# Patient Record
Sex: MALE | Race: WHITE
[De-identification: names, ages, dates, MRNs, and addresses within clinical notes are randomized per-mention and may not be internally consistent; named-entity substitution may affect disease eponyms.]

---

## 2022-07-08 ENCOUNTER — HOSPITAL ENCOUNTER (INPATIENT)
Dept: HOSPITAL 54 - ER | Age: 62
LOS: 1 days | Discharge: HOME | DRG: 247 | End: 2022-07-09
Attending: NURSE PRACTITIONER | Admitting: NURSE PRACTITIONER
Payer: COMMERCIAL

## 2022-07-08 VITALS — SYSTOLIC BLOOD PRESSURE: 129 MMHG | DIASTOLIC BLOOD PRESSURE: 87 MMHG

## 2022-07-08 VITALS — SYSTOLIC BLOOD PRESSURE: 124 MMHG | DIASTOLIC BLOOD PRESSURE: 77 MMHG

## 2022-07-08 VITALS — HEIGHT: 68 IN | BODY MASS INDEX: 24.4 KG/M2 | WEIGHT: 161 LBS

## 2022-07-08 VITALS — SYSTOLIC BLOOD PRESSURE: 120 MMHG | DIASTOLIC BLOOD PRESSURE: 79 MMHG

## 2022-07-08 VITALS — DIASTOLIC BLOOD PRESSURE: 61 MMHG | SYSTOLIC BLOOD PRESSURE: 112 MMHG

## 2022-07-08 VITALS — DIASTOLIC BLOOD PRESSURE: 64 MMHG | SYSTOLIC BLOOD PRESSURE: 121 MMHG

## 2022-07-08 VITALS — SYSTOLIC BLOOD PRESSURE: 120 MMHG | DIASTOLIC BLOOD PRESSURE: 97 MMHG

## 2022-07-08 VITALS — DIASTOLIC BLOOD PRESSURE: 62 MMHG | SYSTOLIC BLOOD PRESSURE: 126 MMHG

## 2022-07-08 DIAGNOSIS — G47.00: ICD-10-CM

## 2022-07-08 DIAGNOSIS — I25.110: Primary | ICD-10-CM

## 2022-07-08 LAB
BASOPHILS # BLD AUTO: 0 K/UL (ref 0–0.2)
BASOPHILS NFR BLD AUTO: 0.7 % (ref 0–2)
BUN SERPL-MCNC: 12 MG/DL (ref 7–18)
CALCIUM SERPL-MCNC: 9.3 MG/DL (ref 8.5–10.1)
CHLORIDE SERPL-SCNC: 103 MMOL/L (ref 98–107)
CO2 SERPL-SCNC: 28 MMOL/L (ref 21–32)
CREAT SERPL-MCNC: 1 MG/DL (ref 0.6–1.3)
EOSINOPHIL NFR BLD AUTO: 2.4 % (ref 0–6)
GLUCOSE SERPL-MCNC: 116 MG/DL (ref 74–106)
HCT VFR BLD AUTO: 45 % (ref 39–51)
HGB BLD-MCNC: 14.9 G/DL (ref 13.5–17.5)
LYMPHOCYTES NFR BLD AUTO: 1.2 K/UL (ref 0.8–4.8)
LYMPHOCYTES NFR BLD AUTO: 24.3 % (ref 20–44)
MCHC RBC AUTO-ENTMCNC: 33 G/DL (ref 31–36)
MCV RBC AUTO: 90 FL (ref 80–96)
MONOCYTES NFR BLD AUTO: 0.7 K/UL (ref 0.1–1.3)
MONOCYTES NFR BLD AUTO: 13.7 % (ref 2–12)
NEUTROPHILS # BLD AUTO: 3 K/UL (ref 1.8–8.9)
NEUTROPHILS NFR BLD AUTO: 58.9 % (ref 43–81)
PLATELET # BLD AUTO: 275 K/UL (ref 150–450)
POTASSIUM SERPL-SCNC: 3.7 MMOL/L (ref 3.5–5.1)
RBC # BLD AUTO: 4.95 MIL/UL (ref 4.5–6)
SODIUM SERPL-SCNC: 143 MMOL/L (ref 136–145)
WBC NRBC COR # BLD AUTO: 5.1 K/UL (ref 4.3–11)

## 2022-07-08 PROCEDURE — B211YZZ FLUOROSCOPY OF MULTIPLE CORONARY ARTERIES USING OTHER CONTRAST: ICD-10-PCS | Performed by: INTERNAL MEDICINE

## 2022-07-08 PROCEDURE — C1769 GUIDE WIRE: HCPCS

## 2022-07-08 PROCEDURE — C1725 CATH, TRANSLUMIN NON-LASER: HCPCS

## 2022-07-08 PROCEDURE — C9601 PERC DRUG-EL COR STENT BRAN: HCPCS

## 2022-07-08 PROCEDURE — C1887 CATHETER, GUIDING: HCPCS

## 2022-07-08 PROCEDURE — 027034Z DILATION OF CORONARY ARTERY, ONE ARTERY WITH DRUG-ELUTING INTRALUMINAL DEVICE, PERCUTANEOUS APPROACH: ICD-10-PCS | Performed by: INTERNAL MEDICINE

## 2022-07-08 PROCEDURE — G0500 MOD SEDAT ENDO SERVICE >5YRS: HCPCS

## 2022-07-08 PROCEDURE — 4A023N7 MEASUREMENT OF CARDIAC SAMPLING AND PRESSURE, LEFT HEART, PERCUTANEOUS APPROACH: ICD-10-PCS | Performed by: INTERNAL MEDICINE

## 2022-07-08 PROCEDURE — G0378 HOSPITAL OBSERVATION PER HR: HCPCS

## 2022-07-08 PROCEDURE — C9803 HOPD COVID-19 SPEC COLLECT: HCPCS

## 2022-07-08 RX ADMIN — TICAGRELOR SCH MG: 90 TABLET ORAL at 17:30

## 2022-07-08 RX ADMIN — METOPROLOL TARTRATE SCH MG: 25 TABLET, FILM COATED ORAL at 21:00

## 2022-07-08 NOTE — NUR
RN NOTE



PATIENT RECEIVED, AWAKE, A&OX4. PATIENT ON RA WITH NO SIGNS OF LABORED BREATHING SAT 98% ON 
BEDSIDE MONITOR. PT DOES NOT REPORT CHEST PAIN AT THIS TIME. TR BAND IN PLACE ON RIGHT WRIST 
WITH 18CC OF AIR PLACED AT 1613 PER REPORT FROM CATH LAB NURSE. RIGHT HAND WARM WITH NORMAL 
SKIN COLOR, NO SIGNS OF BLEEDING AT TR BAND SITE. RIGHT AC AND LEFT AC SL IN PLACE. BED 
LOCKED AND IN LOWEST POSITION, CALL LIGHT WITHIN REACH, 3 SIDE RAILS UP.

## 2022-07-08 NOTE — NUR
ICU/RN:



LAST 3ML AIR REMOVED FROM TC BAND. BAND REMOVED. NO BLEEDING NOTED. TRANSPARENT DRESSING 
APPLIED.

## 2022-07-08 NOTE — NUR
RECEIVED PT 61 YRS MALE WAKING IN FROM CARDIOLOY C/O ABNORMALE EGG  DINESES  
CHEST PAIN c/o chest with exertion

## 2022-07-08 NOTE — NUR
RN CLOSING NOTE



PATIENT REMAINS IN BED, A&OX4. PATIENT ON RA WITH NO SIGNS OF LABORED BREATHING AT THIS 
TIME. SR ON MONITOR. TR BAND IN PLACE, 7CC OUT OF 18CC REMOVED SO FAR WITH NO SIGNS OF 
BLEEDING AT SITE. RIGHT AND LEFT AC SL IN PLACE. BED LOCKED AND IN LOWEST POSITION, CALL 
LIGHT WITHIN REACH, 2 SIDE RAILS UP. WILL ENDORSE TO NIGHT SHIFT RN FOR UZMA.

## 2022-07-09 VITALS — DIASTOLIC BLOOD PRESSURE: 89 MMHG | SYSTOLIC BLOOD PRESSURE: 140 MMHG

## 2022-07-09 VITALS — SYSTOLIC BLOOD PRESSURE: 146 MMHG | DIASTOLIC BLOOD PRESSURE: 82 MMHG

## 2022-07-09 VITALS — DIASTOLIC BLOOD PRESSURE: 79 MMHG | SYSTOLIC BLOOD PRESSURE: 127 MMHG

## 2022-07-09 VITALS — SYSTOLIC BLOOD PRESSURE: 125 MMHG | DIASTOLIC BLOOD PRESSURE: 93 MMHG

## 2022-07-09 VITALS — SYSTOLIC BLOOD PRESSURE: 135 MMHG | DIASTOLIC BLOOD PRESSURE: 90 MMHG

## 2022-07-09 VITALS — DIASTOLIC BLOOD PRESSURE: 86 MMHG | SYSTOLIC BLOOD PRESSURE: 142 MMHG

## 2022-07-09 VITALS — SYSTOLIC BLOOD PRESSURE: 135 MMHG | DIASTOLIC BLOOD PRESSURE: 86 MMHG

## 2022-07-09 VITALS — SYSTOLIC BLOOD PRESSURE: 138 MMHG | DIASTOLIC BLOOD PRESSURE: 93 MMHG

## 2022-07-09 VITALS — DIASTOLIC BLOOD PRESSURE: 93 MMHG | SYSTOLIC BLOOD PRESSURE: 129 MMHG

## 2022-07-09 VITALS — SYSTOLIC BLOOD PRESSURE: 131 MMHG | DIASTOLIC BLOOD PRESSURE: 82 MMHG

## 2022-07-09 VITALS — DIASTOLIC BLOOD PRESSURE: 88 MMHG | SYSTOLIC BLOOD PRESSURE: 136 MMHG

## 2022-07-09 VITALS — DIASTOLIC BLOOD PRESSURE: 88 MMHG | SYSTOLIC BLOOD PRESSURE: 135 MMHG

## 2022-07-09 VITALS — DIASTOLIC BLOOD PRESSURE: 82 MMHG | SYSTOLIC BLOOD PRESSURE: 141 MMHG

## 2022-07-09 LAB
BASOPHILS # BLD AUTO: 0 K/UL (ref 0–0.2)
BASOPHILS NFR BLD AUTO: 0.7 % (ref 0–2)
BUN SERPL-MCNC: 16 MG/DL (ref 7–18)
CALCIUM SERPL-MCNC: 9.1 MG/DL (ref 8.5–10.1)
CHLORIDE SERPL-SCNC: 105 MMOL/L (ref 98–107)
CO2 SERPL-SCNC: 24 MMOL/L (ref 21–32)
CREAT SERPL-MCNC: 1 MG/DL (ref 0.6–1.3)
EOSINOPHIL NFR BLD AUTO: 4 % (ref 0–6)
GLUCOSE SERPL-MCNC: 105 MG/DL (ref 74–106)
HCT VFR BLD AUTO: 43 % (ref 39–51)
HGB BLD-MCNC: 14.6 G/DL (ref 13.5–17.5)
LYMPHOCYTES NFR BLD AUTO: 1.2 K/UL (ref 0.8–4.8)
LYMPHOCYTES NFR BLD AUTO: 22.5 % (ref 20–44)
MAGNESIUM SERPL-MCNC: 2 MG/DL (ref 1.8–2.4)
MCHC RBC AUTO-ENTMCNC: 34 G/DL (ref 31–36)
MCV RBC AUTO: 89 FL (ref 80–96)
MONOCYTES NFR BLD AUTO: 0.6 K/UL (ref 0.1–1.3)
MONOCYTES NFR BLD AUTO: 11.7 % (ref 2–12)
NEUTROPHILS # BLD AUTO: 3.2 K/UL (ref 1.8–8.9)
NEUTROPHILS NFR BLD AUTO: 61.1 % (ref 43–81)
PHOSPHATE SERPL-MCNC: 3.3 MG/DL (ref 2.5–4.9)
PLATELET # BLD AUTO: 256 K/UL (ref 150–450)
POTASSIUM SERPL-SCNC: 3.9 MMOL/L (ref 3.5–5.1)
RBC # BLD AUTO: 4.83 MIL/UL (ref 4.5–6)
SODIUM SERPL-SCNC: 138 MMOL/L (ref 136–145)
TSH SERPL DL<=0.005 MIU/L-ACNC: 1.41 UIU/ML (ref 0.36–3.74)
WBC NRBC COR # BLD AUTO: 5.3 K/UL (ref 4.3–11)

## 2022-07-09 RX ADMIN — METOPROLOL TARTRATE SCH MG: 25 TABLET, FILM COATED ORAL at 08:03

## 2022-07-09 RX ADMIN — TICAGRELOR SCH MG: 90 TABLET ORAL at 08:03

## 2022-07-09 NOTE — NUR
rn notes 

received patient in the bed a/ox4. refused pain, right FA cath insertion side intact. due 
medication administered. patient tolerated breakfast well.  seen cardiologist Dr Sampson, and 
patient will go home in afternoon, will follow pcp, and cardiologist. patient ambulatory, 
self care. no dizziness notes.  was complaining of wheezing,  get one time order via Dr Sampson breathing tx. order taken and carried out.

## 2025-06-23 ENCOUNTER — OFFICE (OUTPATIENT)
Dept: URBAN - METROPOLITAN AREA CLINIC 57 | Facility: CLINIC | Age: 65
End: 2025-06-23

## 2025-06-23 VITALS — HEIGHT: 68 IN

## 2025-06-23 DIAGNOSIS — K62.5 RECTAL BLEEDING: ICD-10-CM

## 2025-06-23 DIAGNOSIS — Z12.11 SCREENING FOR COLON CANCER: ICD-10-CM

## 2025-06-23 DIAGNOSIS — K57.30 DIVERTICULOSIS, COLON: ICD-10-CM

## 2025-06-23 DIAGNOSIS — Z86.010 PERSONAL HISTORY OF COLONIC POLYPS: ICD-10-CM

## 2025-06-23 DIAGNOSIS — Z79.82 LONG-TERM USE OF ASPIRIN THERAPY: ICD-10-CM

## 2025-06-23 PROCEDURE — 99203 OFFICE O/P NEW LOW 30 MIN: CPT | Mod: 95 | Performed by: INTERNAL MEDICINE

## 2025-06-23 PROCEDURE — G0406 INPT/TELE FOLLOW UP 15: HCPCS | Performed by: INTERNAL MEDICINE

## 2025-06-23 NOTE — SERVICEHPINOTES
The patient is a 64-year-old male presenting for follow-up and colonoscopy screening.He reports no current bowel complaints, including no persistent diarrhea or constipation. He denies blood in his stool or dark black stools, though he notes that his hemorrhoids occasionally bleed a little, which resolves on its own. He does not require the use of laxatives. He denies any family history of stomach problems, colon problems, ulcerative colitis, or Crohn’s disease.His past colonoscopies include a procedure in 2020, which revealed a small ileal erosion that was biopsied and attributed to prior use of nonsteroidal anti-inflammatory drugs (NSAIDs) taken for an injury. He discontinued the NSAIDs at that time, and his symptoms resolved. A colonoscopy in 2015 showed a transverse colon tubular adenoma, which was removed, and a few diverticuli. He currently takes aspirin 81 mg daily or every other day, as tolerated, following placement of a cardiac stent three years ago at age 61. He also takes omega-3 supplements and some vitamins. He denies any medication allergies. He has never smoked, rarely drinks alcohol, and is single. He is recently retired from work in [employer] and reports staying active with music. He has a surgical history notable for ankle surgery, hernia repair, ACL repair of both knees, and hand surgery for trigger finger. He reports having had COVID-19 once, which he describes as mild.(April) Laboratory data:br - WBC: 4.8 x10^3/µLbr - Hemoglobin: 14.7 g/dLbr - Hematocrit: 40.1%br - Platelets: 213 x10^3/µLbr - Glucose: 115 mg/dLbr - BUN: 28 mg/dLbr - Creatinine: 1.07 mg/dLbr - ALT: 20 U/Lbr - AST: 19 U/Lbr - Alkaline phosphatase: 42 U/Lbr - Cholesterol: 248 mg/dLbr - LDL: 180 mg/dLbr - Hemoglobin A1c: 6.3%(2020) Colonoscopy: Ileal erosion biopsied, attributed to nonsteroidal anti-inflammatory drug use.(2015) Colonoscopy: Transverse colon tubular adenoma removed diverticuli noted.Patient was informed that the conversation was recorded for scribing purposes. Patient has given verbal consent.

## 2025-08-05 VITALS
RESPIRATION RATE: 16 BRPM | HEART RATE: 50 BPM | TEMPERATURE: 96.9 F | OXYGEN SATURATION: 99 % | DIASTOLIC BLOOD PRESSURE: 86 MMHG | SYSTOLIC BLOOD PRESSURE: 144 MMHG | WEIGHT: 165 LBS | HEIGHT: 68 IN

## 2025-08-06 ENCOUNTER — AMBULATORY SURGICAL CENTER (OUTPATIENT)
Dept: URBAN - METROPOLITAN AREA SURGERY 36 | Facility: SURGERY | Age: 65
End: 2025-08-06

## 2025-08-06 DIAGNOSIS — Z86.010 PERSONAL HISTORY OF COLON POLYPS: ICD-10-CM

## 2025-08-06 DIAGNOSIS — K63.5 POLYP OF COLON: ICD-10-CM

## 2025-08-06 PROBLEM — K57.30 DIVERTICULOSIS OF LARGE INTESTINE WITHOUT PERFORATION OR ABS: Status: ACTIVE | Noted: 2025-08-06

## 2025-08-06 PROCEDURE — 45380 COLONOSCOPY AND BIOPSY: CPT | Performed by: INTERNAL MEDICINE
